# Patient Record
Sex: FEMALE | Race: BLACK OR AFRICAN AMERICAN | NOT HISPANIC OR LATINO | Employment: PART TIME | URBAN - METROPOLITAN AREA
[De-identification: names, ages, dates, MRNs, and addresses within clinical notes are randomized per-mention and may not be internally consistent; named-entity substitution may affect disease eponyms.]

---

## 2023-09-15 ENCOUNTER — LAB VISIT (OUTPATIENT)
Dept: LAB | Facility: HOSPITAL | Age: 53
End: 2023-09-15
Attending: INTERNAL MEDICINE
Payer: MEDICAID

## 2023-09-15 DIAGNOSIS — D75.89 BICYTOPENIA: Primary | ICD-10-CM

## 2023-09-15 DIAGNOSIS — D75.89 BICYTOPENIA: ICD-10-CM

## 2023-09-15 PROCEDURE — 88325 PR  COMPREHENSIVE REVIEW OF DATA: ICD-10-PCS | Mod: ,,, | Performed by: PATHOLOGY

## 2023-09-15 PROCEDURE — 88325 CONSLTJ COMPRE RVW REC REPRT: CPT | Performed by: PATHOLOGY

## 2023-09-15 PROCEDURE — 88325 CONSLTJ COMPRE RVW REC REPRT: CPT | Mod: ,,, | Performed by: PATHOLOGY

## 2023-09-18 LAB
COMMENT: NORMAL
FINAL PATHOLOGIC DIAGNOSIS: NORMAL
Lab: NORMAL
MICROSCOPIC EXAM: NORMAL

## 2023-10-09 ENCOUNTER — OFFICE VISIT (OUTPATIENT)
Dept: HEMATOLOGY/ONCOLOGY | Facility: CLINIC | Age: 53
End: 2023-10-09
Payer: MEDICAID

## 2023-10-09 VITALS
RESPIRATION RATE: 16 BRPM | DIASTOLIC BLOOD PRESSURE: 58 MMHG | TEMPERATURE: 99 F | OXYGEN SATURATION: 100 % | BODY MASS INDEX: 34.17 KG/M2 | WEIGHT: 217.69 LBS | SYSTOLIC BLOOD PRESSURE: 125 MMHG | HEIGHT: 67 IN | HEART RATE: 61 BPM

## 2023-10-09 DIAGNOSIS — D70.8 BENIGN ETHNIC NEUTROPENIA: Primary | ICD-10-CM

## 2023-10-09 PROCEDURE — 99999 PR PBB SHADOW E&M-EST. PATIENT-LVL III: ICD-10-PCS | Mod: PBBFAC,,, | Performed by: INTERNAL MEDICINE

## 2023-10-09 PROCEDURE — 99999 PR PBB SHADOW E&M-EST. PATIENT-LVL III: CPT | Mod: PBBFAC,,, | Performed by: INTERNAL MEDICINE

## 2023-10-09 PROCEDURE — 3074F SYST BP LT 130 MM HG: CPT | Mod: CPTII,,, | Performed by: INTERNAL MEDICINE

## 2023-10-09 PROCEDURE — 3008F PR BODY MASS INDEX (BMI) DOCUMENTED: ICD-10-PCS | Mod: CPTII,,, | Performed by: INTERNAL MEDICINE

## 2023-10-09 PROCEDURE — 99204 PR OFFICE/OUTPT VISIT, NEW, LEVL IV, 45-59 MIN: ICD-10-PCS | Mod: S$PBB,,, | Performed by: INTERNAL MEDICINE

## 2023-10-09 PROCEDURE — 3078F DIAST BP <80 MM HG: CPT | Mod: CPTII,,, | Performed by: INTERNAL MEDICINE

## 2023-10-09 PROCEDURE — 3078F PR MOST RECENT DIASTOLIC BLOOD PRESSURE < 80 MM HG: ICD-10-PCS | Mod: CPTII,,, | Performed by: INTERNAL MEDICINE

## 2023-10-09 PROCEDURE — 3008F BODY MASS INDEX DOCD: CPT | Mod: CPTII,,, | Performed by: INTERNAL MEDICINE

## 2023-10-09 PROCEDURE — 3074F PR MOST RECENT SYSTOLIC BLOOD PRESSURE < 130 MM HG: ICD-10-PCS | Mod: CPTII,,, | Performed by: INTERNAL MEDICINE

## 2023-10-09 PROCEDURE — 99213 OFFICE O/P EST LOW 20 MIN: CPT | Mod: PBBFAC | Performed by: INTERNAL MEDICINE

## 2023-10-09 PROCEDURE — 1159F MED LIST DOCD IN RCRD: CPT | Mod: CPTII,,, | Performed by: INTERNAL MEDICINE

## 2023-10-09 PROCEDURE — 1159F PR MEDICATION LIST DOCUMENTED IN MEDICAL RECORD: ICD-10-PCS | Mod: CPTII,,, | Performed by: INTERNAL MEDICINE

## 2023-10-09 PROCEDURE — 99204 OFFICE O/P NEW MOD 45 MIN: CPT | Mod: S$PBB,,, | Performed by: INTERNAL MEDICINE

## 2023-10-09 RX ORDER — NEOMYCIN SULFATE, POLYMYXIN B SULFATE AND DEXAMETHASONE 3.5; 10000; 1 MG/ML; [USP'U]/ML; MG/ML
1 SUSPENSION/ DROPS OPHTHALMIC 3 TIMES DAILY
COMMUNITY
Start: 2023-10-04

## 2023-10-09 RX ORDER — LEVOTHYROXINE SODIUM 112 UG/1
TABLET ORAL
COMMUNITY
Start: 2023-09-15

## 2023-10-09 NOTE — PROGRESS NOTES
Subjective:       Patient ID: Lyn Massey is a 53 y.o. female.    Chief Complaint: Abnormal Lab    HPI    New patient visit for neutropenia and normocytic anemia. Asymptomatic. Found during evaluation for hairloss that was likely associated with weight loss and needed adjustments in thyroid replacement.    CBC review available for past 10 years.   2014 - ANC 1.5 and normocytic anemia of 11.8.  2023 - ANC 1.2 and normocytic anemia 11.2.    No constitutional B symptoms. Past intentional weight loss on keto diet (hence need to change thyroid medication doses). No recurring infections. No gross blood loss. No fevers. No transfusion requirement.    Completed a thorough evaluation for cytopenias with Dr. Boudreaux at [facility] including normal iron studies, b12, folate, copper, zinc, PNH, Jak2/mpn mutations. Bone marrow biopsy 8/2022 reviewed here with normal cellularity and morphology. Suboptimal aspirate with normal cytogenetics and normal FISH for MDS.    She does have a prior positive [facility]. History of uterine fibroids in transvaginal US. Normal colonoscopy 2013. Started menopause at age 49 with last menstrual period 2/3/2021.    Family history positive for liver cancer (mother) who she is helping care for and sister with recently diagnosed stage IV ovarian cancer. She reports completing familial genetic testing and negative results for any inheritable condition.      Review of Systems   Constitutional: Negative.    HENT: Negative.     Eyes: Negative.    Respiratory: Negative.     Cardiovascular: Negative.    Gastrointestinal: Negative.    Endocrine: Negative.    Genitourinary: Negative.    Musculoskeletal: Negative.    Integumentary:  Negative.   Allergic/Immunologic: Negative for environmental allergies, food allergies and immunocompromised state.   Neurological: Negative.    Hematological:  Negative for adenopathy. Does not bruise/bleed easily.   Psychiatric/Behavioral:  The patient is nervous/anxious.           Objective:      Physical Exam  Vitals and nursing note reviewed.   Constitutional:       Appearance: She is well-developed.   HENT:      Head: Normocephalic and atraumatic.   Eyes:      General: No scleral icterus.     Conjunctiva/sclera: Conjunctivae normal.   Cardiovascular:      Rate and Rhythm: Normal rate.   Pulmonary:      Effort: Pulmonary effort is normal. No respiratory distress.   Abdominal:      General: There is no distension.      Palpations: Abdomen is soft.      Tenderness: There is no abdominal tenderness.   Musculoskeletal:         General: Normal range of motion.      Cervical back: Normal range of motion and neck supple.   Skin:     General: Skin is warm and dry.   Neurological:      Mental Status: She is alert and oriented to person, place, and time.      Cranial Nerves: No cranial nerve deficit.   Psychiatric:         Behavior: Behavior normal.         Current Outpatient Medications   Medication Sig Dispense Refill    SYNTHROID 112 mcg tablet Take by mouth.      cyclobenzaprine (FLEXERIL) 10 MG tablet Take 1 tablet (10 mg total) by mouth nightly as needed for Muscle spasms. May cause drowsiness. (Patient not taking: Reported on 10/9/2023) 15 tablet 0    ibuprofen (ADVIL,MOTRIN) 600 MG tablet Take 1 tablet (600 mg total) by mouth every 6 (six) hours as needed for Pain. (Patient not taking: Reported on 10/9/2023) 20 tablet 0    neomycin-polymyxin-dexamethasone (MAXITROL) 3.5mg/mL-10,000 unit/mL-0.1 % DrpS Place 1 drop into the left eye 3 (three) times daily.       No current facility-administered medications for this visit.      Lab Results   Component Value Date    WBC 4.27 01/11/2014    HGB 11.8 (L) 01/11/2014    HCT 35.4 (L) 01/11/2014    MCV 92 01/11/2014     01/11/2014        CMP  Sodium   Date Value Ref Range Status   05/25/2022 139 136 - 145 mmol/L Final   01/11/2014 136 136 - 145 mmol/L Final     Potassium   Date Value Ref Range Status   05/25/2022 3.8 3.5 - 5.1 mmol/L Final    01/11/2014 4.0 3.5 - 5.1 mmol/L Final     Chloride   Date Value Ref Range Status   01/11/2014 105 95 - 110 mmol/L Final     CO2   Date Value Ref Range Status   01/11/2014 23 23 - 29 mmol/L Final     Carbon Dioxide   Date Value Ref Range Status   05/25/2022 29 21 - 32 mmol/L Final     Glucose   Date Value Ref Range Status   01/11/2014 89 70 - 110 mg/dL Final     BUN   Date Value Ref Range Status   05/25/2022 12.5 7.0 - 18.0 mg/dL Final   01/11/2014 7 6 - 20 mg/dL Final     Creatinine   Date Value Ref Range Status   05/25/2022 0.69 0.55 - 1.02 mg/dL Final   01/11/2014 0.9 0.5 - 1.4 mg/dL Final     Calcium   Date Value Ref Range Status   05/25/2022 9.1 8.5 - 10.1 mg/dL Final   01/11/2014 9.3 8.7 - 10.5 mg/dL Final     Total Protein   Date Value Ref Range Status   01/11/2014 7.5 6.0 - 8.4 g/dL Final     Albumin   Date Value Ref Range Status   05/25/2022 3.6 3.4 - 5.0 g/dL Final   01/11/2014 3.9 3.5 - 5.2 g/dL Final     Total Bilirubin   Date Value Ref Range Status   05/25/2022 0.4 0.2 - 1.0 mg/dL Final     Comment:     Use of this assay is not recommended for patients undergoing treatment with eltrombopag due to potential for falsely elevated results.   01/11/2014 0.5 0.1 - 1.0 mg/dL Final     Comment:     For infants and newborns, interpretation of results should be based  on gestational age, weight and in agreement with clinical  observations.  Premature Infant recommended reference ranges:  Up to 24 hours.............<8.0 mg/dL  Up to 48 hours............<12.0 mg/dL  3-5 days..................<15.0 mg/dL  6-29 days.................<15.0 mg/dL     Alkaline Phosphatase   Date Value Ref Range Status   01/11/2014 40 (L) 55 - 135 U/L Final     AST   Date Value Ref Range Status   05/25/2022 20 15 - 37 U/L Final   01/11/2014 13 10 - 40 U/L Final     ALT   Date Value Ref Range Status   05/25/2022 33 13 - 56 U/L Final   01/11/2014 15 10 - 44 U/L Final     Anion Gap   Date Value Ref Range Status   05/25/2022 2 (L) 5 - 14  Final   01/11/2014 8 8 - 16 mmol/L Final     eGFR if    Date Value Ref Range Status   01/11/2014 >60 >60 mL/min/1.73 m^2 Final     eGFR    Date Value Ref Range Status   05/25/2022 >105 >89 mL/min Final     eGFR if non    Date Value Ref Range Status   01/11/2014 >60 >60 mL/min/1.73 m^2 Final     Comment:     Calculation used to obtain the estimated glomerular filtration  rate (eGFR) is the CKD-EPI equation. Since race is unknown   in our information system, the eGFR values for   -American and Non--American patients are given   for each creatinine result.          Assessment:       Problem List Items Addressed This Visit    None      Plan:       No suspicion for a malignant process.     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    Mild, normocytic anemia of 11.5-12.5 grams not concerning. No nutritional deficiencies found.  Anemia is not progressive.     With normal morphology on marrow core, normal FISH for MDS, normal cytogenetics, and normal MCV there is minimal to no concern to MDS.     Reassurance provided. Continue routine annual CBC.     A total of 20 minutes was spent in pre-visit chart review, personal interpretation of labs and imaging, and medication review. Total visit time 40 minutes, >50 % counseling.